# Patient Record
Sex: FEMALE | Race: WHITE | Employment: UNEMPLOYED | URBAN - METROPOLITAN AREA
[De-identification: names, ages, dates, MRNs, and addresses within clinical notes are randomized per-mention and may not be internally consistent; named-entity substitution may affect disease eponyms.]

---

## 2019-10-15 ENCOUNTER — TELEPHONE (OUTPATIENT)
Dept: PSYCHIATRY | Facility: CLINIC | Age: 74
End: 2019-10-15

## 2019-10-15 NOTE — TELEPHONE ENCOUNTER
PSYCHIATRY CLINIC PHONE INTAKE     SERVICES REQUESTED / INTERESTED IN          Med Management    Presenting Problem and Brief History                              What would you like to be seen for? (brief description):  Pt was seeing a provider at [facility], but is looking to switch because the provider has moved locations along with the lack of aggressive treatment. She's currently taking trazodone 1-2 tablets 100mg  for sleep, clonazepam 1mg tablet up to 3 times a day(she normally takes 2), and aripri Abilify 2mg, dosage unknown (just started a couple of weeks ago). She felt like she had to beg her provider to get help with her symptoms. She was taking gabapentin, and was able to convince the provider to switch to Abilify. If she didn't have the trazodone she wouldn't sleep. Pt's daughter was murdered in [place] and she's having triggers from it, causing a lot of grief. This happened 3 and half years ago. She was hoping the Abilify would help, but she feels like its not helping and she's not sure if her dosage needs to be changed. She has PTSD-like symptoms replaying images of her daughter's death in her head and sometimes screaming. She feels like she doesn't have anything to be happy about.        Have you received a mental health diagnosis? Yes   Which one (s): Depression  Is there any history of developmental delay?  No   Are you currently seeing a mental health provider?  Yes            Who / month last seen: EMDR Therapist and  Former psychiatrist, possibly grief therapist as well  Do you have mental health records elsewhere?  Yes  Will you sign a release so we can obtain them?  Yes    Have you ever been hospitalized for psychiatric reasons?  No  Describe:  NA    Do you have current thoughts of self-harm?  No    Do you currently have thoughts of harming others?  No       Substance Use History     Do you have any history of alcohol / illicit drug use?  No  Describe:  NA  Have you ever received treatment  for this?  No    Describe:  NA     Social History     Does the patient have a guardian?  No    Name / number: NA  Have you had an ACT team in last 12 months?  No  Describe: NA   Do you have any current or past legal issues?  No  Describe: NA   OK to leave a detailed voicemail?  Yes    Medical/ Surgical History                                 There is no problem list on file for this patient.         Medications             No current outpatient medications on file.         DISPOSITION      10/15/19 Intake completed. Adding to resident/NP [facility].  Court Mason,

## 2020-04-30 ENCOUNTER — VIRTUAL VISIT (OUTPATIENT)
Dept: PSYCHIATRY | Facility: CLINIC | Age: 75
End: 2020-04-30
Attending: PSYCHIATRY & NEUROLOGY
Payer: MEDICARE

## 2020-04-30 DIAGNOSIS — F43.81 PERSISTENT COMPLEX BEREAVEMENT DISORDER: Primary | ICD-10-CM

## 2020-04-30 RX ORDER — CLONAZEPAM 1 MG/1
1 TABLET ORAL DAILY
COMMUNITY

## 2020-04-30 ASSESSMENT — PAIN SCALES - GENERAL: PAINLEVEL: NO PAIN (0)

## 2020-04-30 NOTE — PATIENT INSTRUCTIONS
Thank you for coming to the PSYCHIATRY CLINIC.    Lab Testing:  If you had lab testing today and your results are reassuring or normal they will be mailed to you or sent through SignNow within 7 days.   If the lab tests need quick action we will call you with the results.  The phone number we will call with results is # [phone] (home) . If this is not the best number please call our clinic and change the number.    Medication Refills:  If you need any refills please call your pharmacy and they will contact us. Our fax number for refills is [phone]. Please allow three business for refill processing.   If you need to  your refill at a new pharmacy, please contact the new pharmacy directly. The new pharmacy will help you get your medications transferred.     Scheduling:  If you have any concerns about today's visit or wish to schedule another appointment please call our office during normal business hours [phone] (8-5:00 M-F)    Contact Us:  Please call [phone] during business hours (8-5:00 M-F).  If after clinic hours, or on the weekend, please call [phone].    Financial Assistance: [phone]  [phone]  Central Billing Office, [employer]: [phone]  [phone]  Medical Records: [phone]    MENTAL HEALTH CRISIS NUMBERS:  [facility]:   [facility]: [phone]  Crisis Residence \[facility] [place] [facility]\"" [facility]: [phone]   Walk-In Counseling Center \[facility] [place] [facility]\"": [phone]   [facility] Mobile Team: [phone] (adults) [phone] (child)     [facility]:   [facility]: [phone]   Walk-in counseling [facility]: [phone]   Walk-in counseling [facility]: [phone]   Crisis Residence [facility]: [phone]   Urgent Care Adult Mental Health: [phone] Mobile team AND  crisis line    Other Crisis Numbers:   National Suicide  Prevention Lifeline: 164-909-WUVR (833-943-1173)  CRISIS TEXT LINE: Text to 896776 for any crisis 24/7; OR www.crisistextline.org   Poison Control Center: 1-996-995-0185  CHILD: [facility] needs assessment team: [phone]   Trans Lifeline: 1-258.267.3662  [facility] Lifeline: 1-506.100.3663    For a medical emergency please call 911 or go to the nearest ER.         _____________________________________________    Again thank you for choosing PSYCHIATRY CLINIC and please let us know how we can best partner with you to improve you and your family's health.    You may be receiving a survey regarding this appointment. We would love to have your feedback, both positive and negative. The survey is done by an external company, so your answers are anonymous.
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
## 2020-04-30 NOTE — PROGRESS NOTES
VIDEO VISIT  Ebonie Barahona is a 74 year old patient who is being evaluated via a billable video visit.      The patient has been notified of following:   This video visit will be conducted via a call between you and your physician/provider. We have found that certain health care needs can be provided without the need for an in-person physical exam. This service lets us provide the care you need with a video conversation. If a prescription is necessary we can send it directly to your pharmacy. If lab work is needed we can place an order for that and you can then stop by our lab to have the test done at a later time. Video visits are billed at different rates depending on your insurance coverage. Please reach out to your insurance provider with any questions. If during the course of the call the physician/provider feels a video visit is not appropriate, you will not be charged for this service.    Patient has given verbal consent for video visit?:  Yes     How would you like to obtain your AVS?:  email: [email or web address]    Video invitation for patient:  Send to e-mail at: [unfilled] [email or web address]      AVS SmartPhrase [PsychAVS] has been placed in 'Patient Instructions':  Yes

## 2020-05-01 ENCOUNTER — TELEPHONE (OUTPATIENT)
Dept: PSYCHOLOGY | Facility: CLINIC | Age: 75
End: 2020-05-01

## 2020-05-01 ENCOUNTER — TELEPHONE (OUTPATIENT)
Dept: PSYCHIATRY | Facility: CLINIC | Age: 75
End: 2020-05-01

## 2020-05-01 NOTE — TELEPHONE ENCOUNTER
On 4/30/2020 the patient signed an AVILA authorizing medical records to be released from [facility] to Ebonie Barahona for the purpose of personal use. I sent the request to medical records via the tube system and kept a copy in psychiatry until scanning is complete.  Noemy Toussaint, [facility]

## 2020-05-01 NOTE — TELEPHONE ENCOUNTER
On 4/30/2020 the patient signed a PHI authorizing electronic communication with [email or web address]  I sent this document to scanning on 5/1/2020 and kept a copy in Psychiatry until scanning is confirmed. Marquita Toussaint, CMA

## 2020-12-15 ENCOUNTER — MEDICAL CORRESPONDENCE (OUTPATIENT)
Dept: HEALTH INFORMATION MANAGEMENT | Facility: CLINIC | Age: 75
End: 2020-12-15

## 2021-01-03 ENCOUNTER — HEALTH MAINTENANCE LETTER (OUTPATIENT)
Age: 76
End: 2021-01-03

## 2021-03-05 ENCOUNTER — TELEPHONE (OUTPATIENT)
Dept: PSYCHIATRY | Facility: CLINIC | Age: 76
End: 2021-03-05

## 2021-10-10 ENCOUNTER — HEALTH MAINTENANCE LETTER (OUTPATIENT)
Age: 76
End: 2021-10-10

## 2022-01-30 ENCOUNTER — HEALTH MAINTENANCE LETTER (OUTPATIENT)
Age: 77
End: 2022-01-30

## 2022-03-26 ENCOUNTER — HEALTH MAINTENANCE LETTER (OUTPATIENT)
Age: 77
End: 2022-03-26

## 2022-09-18 ENCOUNTER — HEALTH MAINTENANCE LETTER (OUTPATIENT)
Age: 77
End: 2022-09-18

## 2023-05-07 ENCOUNTER — HEALTH MAINTENANCE LETTER (OUTPATIENT)
Age: 78
End: 2023-05-07